# Patient Record
Sex: OTHER/UNKNOWN | Race: WHITE | Employment: FULL TIME | URBAN - METROPOLITAN AREA
[De-identification: names, ages, dates, MRNs, and addresses within clinical notes are randomized per-mention and may not be internally consistent; named-entity substitution may affect disease eponyms.]

---

## 2022-04-18 PROBLEM — R60.0 LOCALIZED EDEMA: Status: ACTIVE | Noted: 2022-04-18

## 2022-05-23 ENCOUNTER — OFFICE VISIT (OUTPATIENT)
Dept: CARDIOLOGY CLINIC | Age: 72
End: 2022-05-23
Payer: MEDICARE

## 2022-05-23 VITALS
WEIGHT: 169 LBS | HEART RATE: 90 BPM | HEIGHT: 66 IN | DIASTOLIC BLOOD PRESSURE: 60 MMHG | SYSTOLIC BLOOD PRESSURE: 114 MMHG | BODY MASS INDEX: 27.16 KG/M2

## 2022-05-23 DIAGNOSIS — R60.0 LOCALIZED EDEMA: Primary | ICD-10-CM

## 2022-05-23 DIAGNOSIS — I10 PRIMARY HYPERTENSION: ICD-10-CM

## 2022-05-23 PROCEDURE — 1123F ACP DISCUSS/DSCN MKR DOCD: CPT | Performed by: INTERNAL MEDICINE

## 2022-05-23 PROCEDURE — 99214 OFFICE O/P EST MOD 30 MIN: CPT | Performed by: INTERNAL MEDICINE

## 2022-05-23 PROCEDURE — G8427 DOCREV CUR MEDS BY ELIG CLIN: HCPCS | Performed by: INTERNAL MEDICINE

## 2022-05-23 PROCEDURE — 4004F PT TOBACCO SCREEN RCVD TLK: CPT | Performed by: INTERNAL MEDICINE

## 2022-05-23 PROCEDURE — 3017F COLORECTAL CA SCREEN DOC REV: CPT | Performed by: INTERNAL MEDICINE

## 2022-05-23 PROCEDURE — G8417 CALC BMI ABV UP PARAM F/U: HCPCS | Performed by: INTERNAL MEDICINE

## 2022-05-23 RX ORDER — POTASSIUM CHLORIDE 750 MG/1
10 TABLET, EXTENDED RELEASE ORAL 2 TIMES DAILY
COMMUNITY
Start: 2022-04-18

## 2022-05-23 RX ORDER — LOSARTAN POTASSIUM AND HYDROCHLOROTHIAZIDE 25; 100 MG/1; MG/1
0.5 TABLET ORAL DAILY
COMMUNITY

## 2022-05-23 RX ORDER — GABAPENTIN 300 MG/1
600 CAPSULE ORAL 3 TIMES DAILY
COMMUNITY
Start: 2021-10-18

## 2022-05-23 RX ORDER — CHOLECALCIFEROL (VITAMIN D3) 125 MCG
500 CAPSULE ORAL DAILY
COMMUNITY

## 2022-05-23 RX ORDER — HYDROCODONE BITARTRATE AND ACETAMINOPHEN 10; 325 MG/1; MG/1
1 TABLET ORAL EVERY 8 HOURS PRN
COMMUNITY

## 2022-05-23 RX ORDER — PRAVASTATIN SODIUM 40 MG
40 TABLET ORAL
COMMUNITY

## 2022-05-23 RX ORDER — FUROSEMIDE 20 MG/1
20 TABLET ORAL DAILY
COMMUNITY

## 2022-05-23 RX ORDER — LORAZEPAM 0.5 MG/1
0.5 TABLET ORAL 3 TIMES DAILY PRN
COMMUNITY
Start: 2019-12-21

## 2022-05-23 RX ORDER — MIRTAZAPINE 15 MG/1
15 TABLET, FILM COATED ORAL NIGHTLY
COMMUNITY

## 2022-05-23 RX ORDER — ALENDRONATE SODIUM 70 MG/1
70 TABLET ORAL WEEKLY
COMMUNITY

## 2022-05-23 NOTE — PROGRESS NOTES
[address], [address]  PHONE: [phone]     22    NAME:  Anila Turcios  : 1950  MRN: [id number]       SUBJECTIVE:   Anila Turcios is a 66 y.o. adult seen for a follow up visit regarding the following:     Chief Complaint   Patient presents with    Hypertension     echo and labs        HPI:   Here for worsening LE edema. Echo 2022: normal EF. She has been struggling with back issues, some stress. Now on klonopin at night. Some memory issues per daughter and spouse today. NO new edema, better on lasix, no CP. Unstable with back issues. Cannot walking well. No CP. Feeling well, edema and [place] better. Patient denies recent history of orthopnea, PND, excessive dizziness and/or syncope. Past Medical History, Past Surgical History, Family history, Social History, and Medications were all reviewed with the patient today and updated as necessary. Current Outpatient Medications   Medication Sig Dispense Refill    losartan-hydroCHLOROthiazide (HYZAAR) 100-25 MG per tablet Take 0.5 tablets by mouth daily      alendronate (FOSAMAX) 70 MG tablet Take 70 mg by mouth once a week      Calcium Carb-Cholecalciferol (CALCIUM 600/VITAMIN D3 PO) Take 25 mcg by mouth daily      Multiple Vitamins-Minerals (MULTIVITAMIN WOMEN PO) Take 1 tablet by mouth daily      potassium chloride (KLOR-CON M) 10 MEQ extended release tablet Take 10 mEq by mouth 2 times daily      HYDROcodone-acetaminophen (NORCO)  MG per tablet Take 1 tablet by mouth every 8 hours as needed.  LORazepam (ATIVAN) 0.5 MG tablet Take 0.5 mg by mouth 3 times daily as needed.  furosemide (LASIX) 20 MG tablet Take 20 mg by mouth daily      gabapentin (NEURONTIN) 300 MG capsule Take 600 mg by mouth 3 times daily.       pravastatin (PRAVACHOL) 40 MG tablet Take 40 mg by mouth      vitamin B-12 (CYANOCOBALAMIN) 500 MCG tablet Take 500 mcg by mouth daily      mirtazapine (REMERON) 15 MG tablet Take 15 mg by mouth nightly       No current facility-administered medications for this visit. Allergies   Allergen Reactions    Codeine Itching     Other reaction(s): Agitation-Intolerance    Pregabalin Other (See Comments)     Other reaction(s): Agitation-Intolerance  NUMBNESS OF MOUTH       Patient Active Problem List    Diagnosis Date Noted    Primary hypertension 04/18/2022    Localized edema 04/18/2022    Pure hypercholesterolemia 04/18/2022      Past Surgical History:   Procedure Laterality Date    BACK SURGERY       Family History   Problem Relation Age of Onset    Coronary Art Dis Brother     Heart Attack Father     Sudden Death Father     Coronary Art Dis Father     Sudden Death Brother      Social History     Tobacco Use    Smoking status: Current Every Day Smoker    Smokeless tobacco: Never Used   Substance Use Topics    Alcohol use: Yes         ROS:    No obvious pertinent positives on review of systems except for what was outlined in the HPI today.       PHYSICAL EXAM:     /60   Pulse 90   Ht 5' 6\" (1.676 m)   Wt 169 lb (76.7 kg)   BMI 27.28 kg/m²    General/Constitutional:   Alert and oriented x 3, no acute distress  HEENT:   normocephalic, atraumatic, moist mucous membranes  Neck:   No JVD or carotid bruits bilaterally  Cardiovascular:   regular rate and rhythm, no murmur/rub/gallop appreciated  Pulmonary:   clear to auscultation bilaterally, no respiratory distress  Abdomen:   soft, non-tender, non-distended  Ext:   No sig LE edema bilaterally  Skin:  warm and dry, no obvious rashes seen  Neuro:   no obvious sensory or motor deficits  Psychiatric:   normal mood and affect      Lab Results   Component Value Date     05/05/2022    K 4.2 05/05/2022    CL 96 05/05/2022    CO2 18 05/05/2022    BUN 15 05/05/2022    CREATININE 1.12 05/05/2022    GLUCOSE 94 05/05/2022    CALCIUM 8.7 05/05/2022        No results found for: WBC, HGB, HCT, MCV, PLT    Lab Results   Component Value Date    TSH 0.786 05/05/2022       No results found for: LABA1C  No results found for: EAG      I have Independently reviewed prior care notes, any ER records available, cardiac testing, labs and results with the patient and before seeing the patient today. Also independently reviewed outside records when available. ASSESSMENT:    Jerry Kothari was seen today for hypertension. Diagnoses and all orders for this visit:    Localized edema    Primary hypertension          PLAN:     1. LE edema:  likely from venous insuff, not very mobile now. Reviewed elevating the legs, low salt diet as well. Taking lasix 40 daily. Edema better. EF normal.   Pro-BNP 66, low. Change lasix to PRN and follow. Labs ok. Heart failure history and medications were reviewed with the patient today. Action plan for diuretics was reviewed (if needed) with the patient, including importance of daily weights and low sodium diet. Importance of BP control as well. The patient has been instructed to call our office with worsening shortness of breath, edema or other heart failure related signs/symptoms. All questions were answered with the patient voicing understanding. Patient has been instructed and agrees to call our office with any issues or other concerns related to their cardiac condition(s) and/or complaint(s). Return in about 6 months (around 11/23/2022).        NOEMI MEDINA,   5/23/2022